# Patient Record
Sex: FEMALE | Race: WHITE | NOT HISPANIC OR LATINO
[De-identification: names, ages, dates, MRNs, and addresses within clinical notes are randomized per-mention and may not be internally consistent; named-entity substitution may affect disease eponyms.]

---

## 2017-07-20 PROBLEM — O24.419 GESTATIONAL DIABETES MELLITUS (GDM) IN THIRD TRIMESTER, GESTATIONAL DIABETES METHOD OF CONTROL UNSPECIFIED: Status: ACTIVE | Noted: 2017-07-20

## 2017-07-27 ENCOUNTER — HOSPITAL (OUTPATIENT)
Dept: OTHER | Age: 37
End: 2017-07-27
Attending: OBSTETRICS & GYNECOLOGY

## 2017-08-16 ENCOUNTER — HOSPITAL ENCOUNTER (OUTPATIENT)
Facility: HOSPITAL | Age: 37
Setting detail: OBSERVATION
Discharge: HOME | End: 2017-08-16
Attending: OBSTETRICS & GYNECOLOGY | Admitting: OBSTETRICS & GYNECOLOGY
Payer: MEDICAID

## 2017-08-16 VITALS
SYSTOLIC BLOOD PRESSURE: 118 MMHG | HEART RATE: 90 BPM | RESPIRATION RATE: 18 BRPM | TEMPERATURE: 98 F | DIASTOLIC BLOOD PRESSURE: 84 MMHG

## 2017-08-16 PROBLEM — Z34.90 PREGNANCY: Status: ACTIVE | Noted: 2017-08-16

## 2017-08-16 LAB
BILIRUBIN URINE: NEGATIVE
BLOOD URINE: NEGATIVE
CONTROL RUN WITHIN 24 HOURS?: YES
FETAL FIBRINECTIN: NEGATIVE
GLUCOSE BLD-MCNC: 79 MG/DL (ref 65–99)
GLUCOSE URINE: NEGATIVE
KETONE URINE: NEGATIVE
LEUKOCYTE ESTERASE URINE: NEGATIVE
NITRITE URINE: NEGATIVE
PH URINE: 7 (ref 5–8)
PROTEIN URINE: NEGATIVE
SPEC GRAVITY: 1.01 (ref 1–1.03)
URINE CLARITY: CLEAR
UROBILINOGEN URINE: 0.2

## 2017-08-16 PROCEDURE — 82962 GLUCOSE BLOOD TEST: CPT

## 2017-08-16 PROCEDURE — 96360 HYDRATION IV INFUSION INIT: CPT

## 2017-08-16 PROCEDURE — 82731 ASSAY OF FETAL FIBRONECTIN: CPT | Performed by: OBSTETRICS & GYNECOLOGY

## 2017-08-16 PROCEDURE — 96372 THER/PROPH/DIAG INJ SC/IM: CPT

## 2017-08-16 PROCEDURE — 96361 HYDRATE IV INFUSION ADD-ON: CPT

## 2017-08-16 PROCEDURE — 81002 URINALYSIS NONAUTO W/O SCOPE: CPT

## 2017-08-16 RX ORDER — SODIUM CHLORIDE, SODIUM LACTATE, POTASSIUM CHLORIDE, CALCIUM CHLORIDE 600; 310; 30; 20 MG/100ML; MG/100ML; MG/100ML; MG/100ML
INJECTION, SOLUTION INTRAVENOUS CONTINUOUS
Status: DISCONTINUED | OUTPATIENT
Start: 2017-08-16 | End: 2017-08-16

## 2017-08-16 RX ORDER — TERBUTALINE SULFATE 1 MG/ML
0.25 INJECTION, SOLUTION SUBCUTANEOUS
Status: DISPENSED | OUTPATIENT
Start: 2017-08-16 | End: 2017-08-16

## 2017-08-16 RX ORDER — FERROUS SULFATE 325(65) MG
TABLET ORAL
Refills: 6 | Status: ON HOLD | COMMUNITY
Start: 2017-07-05 | End: 2017-08-16

## 2017-08-16 NOTE — NST
Patient: Marcelo Hanson    Gestation: 31w3d r/o PTL                                                                                                         Interpretation: Appropriate for gestational age Yissel Jain viewed fht strip at [facility])

## 2017-08-16 NOTE — H&P
[facility]  History & Physical    Blaine Amla Patient Status:  Observation    1980 MRN [id number]   Location [address] [facility] Attending Loren Ramos MD   Hosp Day # 0 PCP Ryan Navarrete MD     SUBJECTIVE:    Blaine Marquez is 20  BP: (118)/(84) 118/84    Abdomen:  soft, nontender, gravid   Fetal Surveillance:  130s, reactive   Initially q 3 min, now s/p terbutaline, irreg      Cervix: 1/50/-2 by RN     Lab Review:    Admission on 08/16/2017   Component Date Value   • control ru

## 2017-08-17 PROBLEM — O09.529 ADVANCED MATERNAL AGE IN MULTIGRAVIDA: Status: ACTIVE | Noted: 2017-08-17

## 2017-08-17 PROBLEM — Z87.59 HISTORY OF PRIOR PREGNANCY WITH SGA NEWBORN: Status: ACTIVE | Noted: 2017-08-17

## 2017-08-18 ENCOUNTER — HOSPITAL ENCOUNTER (INPATIENT)
Facility: HOSPITAL | Age: 37
LOS: 2 days | Discharge: HOME | DRG: 781 | End: 2017-08-20
Attending: OBSTETRICS & GYNECOLOGY | Admitting: OBSTETRICS & GYNECOLOGY
Payer: MEDICAID

## 2017-08-18 DIAGNOSIS — K83.1 CHOLESTASIS DURING PREGNANCY, ANTEPARTUM: Primary | ICD-10-CM

## 2017-08-18 DIAGNOSIS — O26.619 CHOLESTASIS DURING PREGNANCY, ANTEPARTUM: Primary | ICD-10-CM

## 2017-08-18 DIAGNOSIS — Z3A.32 32 WEEKS GESTATION OF PREGNANCY: ICD-10-CM

## 2017-08-18 LAB
ALBUMIN SERPL-MCNC: 1.8 G/DL (ref 3.5–4.8)
ALP LIVER SERPL-CCNC: 324 U/L (ref 37–98)
ALT SERPL-CCNC: 140 U/L (ref 14–54)
AST SERPL-CCNC: 110 U/L (ref 15–41)
BASOPHILS # BLD AUTO: 0.03 X10(3) UL (ref 0–0.1)
BASOPHILS NFR BLD AUTO: 0.2 %
BILIRUB SERPL-MCNC: 0.5 MG/DL (ref 0.1–2)
BUN BLD-MCNC: 8 MG/DL (ref 8–20)
CALCIUM BLD-MCNC: 8.5 MG/DL (ref 8.3–10.3)
CHLORIDE: 102 MMOL/L (ref 101–111)
CO2: 24 MMOL/L (ref 22–32)
CREAT BLD-MCNC: 0.62 MG/DL (ref 0.55–1.02)
EOSINOPHIL # BLD AUTO: 0.03 X10(3) UL (ref 0–0.3)
EOSINOPHIL NFR BLD AUTO: 0.2 %
ERYTHROCYTE [DISTWIDTH] IN BLOOD BY AUTOMATED COUNT: 14.5 % (ref 11.5–16)
GLUCOSE BLD-MCNC: 79 MG/DL (ref 70–99)
HCT VFR BLD AUTO: 31.7 % (ref 34–50)
HGB BLD-MCNC: 9.8 G/DL (ref 12–16)
IMMATURE GRANULOCYTE COUNT: 0.14 X10(3) UL (ref 0–1)
IMMATURE GRANULOCYTE RATIO %: 1.2 %
LYMPHOCYTES # BLD AUTO: 2.05 X10(3) UL (ref 0.9–4)
LYMPHOCYTES NFR BLD AUTO: 16.9 %
M PROTEIN MFR SERPL ELPH: 6.7 G/DL (ref 6.1–8.3)
MCH RBC QN AUTO: 25.1 PG (ref 27–33.2)
MCHC RBC AUTO-ENTMCNC: 30.9 G/DL (ref 31–37)
MCV RBC AUTO: 81.3 FL (ref 81–100)
MONOCYTES # BLD AUTO: 0.65 X10(3) UL (ref 0.1–0.6)
MONOCYTES NFR BLD AUTO: 5.4 %
NEUTROPHIL ABS PRELIM: 9.22 X10 (3) UL (ref 1.3–6.7)
NEUTROPHILS # BLD AUTO: 9.22 X10(3) UL (ref 1.3–6.7)
NEUTROPHILS NFR BLD AUTO: 76.1 %
PLATELET # BLD AUTO: 190 10(3)UL (ref 150–450)
POTASSIUM SERPL-SCNC: 3.9 MMOL/L (ref 3.6–5.1)
RBC # BLD AUTO: 3.9 X10(6)UL (ref 3.8–5.1)
RED CELL DISTRIBUTION WIDTH-SD: 42.3 FL (ref 35.1–46.3)
SODIUM SERPL-SCNC: 136 MMOL/L (ref 136–144)
WBC # BLD AUTO: 12.1 X10(3) UL (ref 4–13)

## 2017-08-18 PROCEDURE — 84550 ASSAY OF BLOOD/URIC ACID: CPT | Performed by: OBSTETRICS & GYNECOLOGY

## 2017-08-18 PROCEDURE — 85025 COMPLETE CBC W/AUTO DIFF WBC: CPT | Performed by: OBSTETRICS & GYNECOLOGY

## 2017-08-18 PROCEDURE — 80053 COMPREHEN METABOLIC PANEL: CPT | Performed by: OBSTETRICS & GYNECOLOGY

## 2017-08-18 RX ORDER — ONDANSETRON 2 MG/ML
4 INJECTION INTRAMUSCULAR; INTRAVENOUS EVERY 6 HOURS PRN
Status: DISCONTINUED | OUTPATIENT
Start: 2017-08-18 | End: 2017-08-20

## 2017-08-18 RX ORDER — TRISODIUM CITRATE DIHYDRATE AND CITRIC ACID MONOHYDRATE 500; 334 MG/5ML; MG/5ML
30 SOLUTION ORAL ONCE
Status: COMPLETED | OUTPATIENT
Start: 2017-08-18 | End: 2017-08-18

## 2017-08-18 RX ORDER — SODIUM CHLORIDE, SODIUM LACTATE, POTASSIUM CHLORIDE, CALCIUM CHLORIDE 600; 310; 30; 20 MG/100ML; MG/100ML; MG/100ML; MG/100ML
INJECTION, SOLUTION INTRAVENOUS CONTINUOUS
Status: DISCONTINUED | OUTPATIENT
Start: 2017-08-18 | End: 2017-08-20

## 2017-08-19 ENCOUNTER — APPOINTMENT (OUTPATIENT)
Dept: ULTRASOUND IMAGING | Facility: HOSPITAL | Age: 37
DRG: 781 | End: 2017-08-19
Attending: OBSTETRICS & GYNECOLOGY
Payer: MEDICAID

## 2017-08-19 LAB
ALBUMIN SERPL-MCNC: 1.7 G/DL (ref 3.5–4.8)
ALP LIVER SERPL-CCNC: 291 U/L (ref 37–98)
ALT SERPL-CCNC: 142 U/L (ref 14–54)
ANTIBODY SCREEN: NEGATIVE
AST SERPL-CCNC: 118 U/L (ref 15–41)
BASOPHILS # BLD AUTO: 0.02 X10(3) UL (ref 0–0.1)
BASOPHILS NFR BLD AUTO: 0.2 %
BILIRUB SERPL-MCNC: 0.6 MG/DL (ref 0.1–2)
BILIRUB UR QL STRIP.AUTO: NEGATIVE
BLOOD URINE: NEGATIVE
BUN BLD-MCNC: 9 MG/DL (ref 8–20)
CALCIUM BLD-MCNC: 8.6 MG/DL (ref 8.3–10.3)
CHLORIDE: 103 MMOL/L (ref 101–111)
CLARITY UR REFRACT.AUTO: CLEAR
CO2: 25 MMOL/L (ref 22–32)
COLOR UR AUTO: YELLOW
CONTROL RUN WITHIN 24 HOURS?: YES
CREAT BLD-MCNC: 0.57 MG/DL (ref 0.55–1.02)
EOSINOPHIL # BLD AUTO: 0.02 X10(3) UL (ref 0–0.3)
EOSINOPHIL NFR BLD AUTO: 0.2 %
ERYTHROCYTE [DISTWIDTH] IN BLOOD BY AUTOMATED COUNT: 14.7 % (ref 11.5–16)
GLUCOSE BLD-MCNC: 70 MG/DL (ref 70–99)
GLUCOSE BLD-MCNC: 99 MG/DL (ref 65–99)
GLUCOSE BLD-MCNC: 99 MG/DL (ref 65–99)
GLUCOSE UR STRIP.AUTO-MCNC: NEGATIVE MG/DL
GLUCOSE URINE: NEGATIVE
HCT VFR BLD AUTO: 27.9 % (ref 34–50)
HGB BLD-MCNC: 8.6 G/DL (ref 12–16)
IMMATURE GRANULOCYTE COUNT: 0.11 X10(3) UL (ref 0–1)
IMMATURE GRANULOCYTE RATIO %: 1.1 %
KETONE URINE: 40
KETONES UR STRIP.AUTO-MCNC: NEGATIVE MG/DL
LEUKOCYTE ESTERASE URINE: NEGATIVE
LYMPHOCYTES # BLD AUTO: 2.43 X10(3) UL (ref 0.9–4)
LYMPHOCYTES NFR BLD AUTO: 24 %
M PROTEIN MFR SERPL ELPH: 6.2 G/DL (ref 6.1–8.3)
MCH RBC QN AUTO: 25.6 PG (ref 27–33.2)
MCHC RBC AUTO-ENTMCNC: 30.8 G/DL (ref 31–37)
MCV RBC AUTO: 83 FL (ref 81–100)
MONOCYTES # BLD AUTO: 0.5 X10(3) UL (ref 0.1–0.6)
MONOCYTES NFR BLD AUTO: 4.9 %
NEUTROPHIL ABS PRELIM: 7.05 X10 (3) UL (ref 1.3–6.7)
NEUTROPHILS # BLD AUTO: 7.05 X10(3) UL (ref 1.3–6.7)
NEUTROPHILS NFR BLD AUTO: 69.6 %
NITRITE UR QL STRIP.AUTO: NEGATIVE
NITRITE URINE: NEGATIVE
PH UR STRIP.AUTO: 6 [PH] (ref 4.5–8)
PH URINE: 7 (ref 5–8)
PLATELET # BLD AUTO: 160 10(3)UL (ref 150–450)
POTASSIUM SERPL-SCNC: 3.6 MMOL/L (ref 3.6–5.1)
PROT UR STRIP.AUTO-MCNC: NEGATIVE MG/DL
PROTEIN URINE: 30
RBC # BLD AUTO: 3.36 X10(6)UL (ref 3.8–5.1)
RED CELL DISTRIBUTION WIDTH-SD: 44.2 FL (ref 35.1–46.3)
RH BLOOD TYPE: POSITIVE
SODIUM SERPL-SCNC: 137 MMOL/L (ref 136–144)
SP GR UR STRIP.AUTO: 1.01 (ref 1–1.03)
SPEC GRAVITY: 1.02 (ref 1–1.03)
T PALLIDUM AB SER QL IA: NONREACTIVE
URIC ACID: 3.8 MG/DL (ref 2.4–8)
URIC ACID: 4.1 MG/DL (ref 2.4–8)
UROBILINOGEN UR STRIP.AUTO-MCNC: <2 MG/DL
UROBILINOGEN URINE: 1
WBC # BLD AUTO: 10.1 X10(3) UL (ref 4–13)

## 2017-08-19 PROCEDURE — 84550 ASSAY OF BLOOD/URIC ACID: CPT | Performed by: OBSTETRICS & GYNECOLOGY

## 2017-08-19 PROCEDURE — 86901 BLOOD TYPING SEROLOGIC RH(D): CPT | Performed by: OBSTETRICS & GYNECOLOGY

## 2017-08-19 PROCEDURE — 86900 BLOOD TYPING SEROLOGIC ABO: CPT | Performed by: OBSTETRICS & GYNECOLOGY

## 2017-08-19 PROCEDURE — 87086 URINE CULTURE/COLONY COUNT: CPT | Performed by: OBSTETRICS & GYNECOLOGY

## 2017-08-19 PROCEDURE — 81002 URINALYSIS NONAUTO W/O SCOPE: CPT

## 2017-08-19 PROCEDURE — 80053 COMPREHEN METABOLIC PANEL: CPT | Performed by: OBSTETRICS & GYNECOLOGY

## 2017-08-19 PROCEDURE — 82962 GLUCOSE BLOOD TEST: CPT

## 2017-08-19 PROCEDURE — 86850 RBC ANTIBODY SCREEN: CPT | Performed by: OBSTETRICS & GYNECOLOGY

## 2017-08-19 PROCEDURE — 76805 OB US >/= 14 WKS SNGL FETUS: CPT | Performed by: OBSTETRICS & GYNECOLOGY

## 2017-08-19 PROCEDURE — 85025 COMPLETE CBC W/AUTO DIFF WBC: CPT | Performed by: OBSTETRICS & GYNECOLOGY

## 2017-08-19 PROCEDURE — 82239 BILE ACIDS TOTAL: CPT | Performed by: OBSTETRICS & GYNECOLOGY

## 2017-08-19 PROCEDURE — 86780 TREPONEMA PALLIDUM: CPT | Performed by: OBSTETRICS & GYNECOLOGY

## 2017-08-19 PROCEDURE — 81001 URINALYSIS AUTO W/SCOPE: CPT | Performed by: OBSTETRICS & GYNECOLOGY

## 2017-08-19 PROCEDURE — 76700 US EXAM ABDOM COMPLETE: CPT | Performed by: OBSTETRICS & GYNECOLOGY

## 2017-08-19 PROCEDURE — 76819 FETAL BIOPHYS PROFIL W/O NST: CPT | Performed by: OBSTETRICS & GYNECOLOGY

## 2017-08-19 RX ORDER — HYDROMORPHONE HYDROCHLORIDE 1 MG/ML
1 INJECTION, SOLUTION INTRAMUSCULAR; INTRAVENOUS; SUBCUTANEOUS ONCE
Status: COMPLETED | OUTPATIENT
Start: 2017-08-19 | End: 2017-08-19

## 2017-08-19 RX ORDER — ACETAMINOPHEN 500 MG
1000 TABLET ORAL EVERY 6 HOURS PRN
Status: DISCONTINUED | OUTPATIENT
Start: 2017-08-19 | End: 2017-08-20

## 2017-08-19 RX ORDER — DEXTROSE MONOHYDRATE 25 G/50ML
50 INJECTION, SOLUTION INTRAVENOUS
Status: DISCONTINUED | OUTPATIENT
Start: 2017-08-19 | End: 2017-08-20

## 2017-08-19 RX ORDER — HYDROMORPHONE HYDROCHLORIDE 1 MG/ML
INJECTION, SOLUTION INTRAMUSCULAR; INTRAVENOUS; SUBCUTANEOUS
Status: COMPLETED
Start: 2017-08-19 | End: 2017-08-19

## 2017-08-19 RX ORDER — DOCUSATE SODIUM 100 MG/1
100 CAPSULE, LIQUID FILLED ORAL 2 TIMES DAILY
Status: DISCONTINUED | OUTPATIENT
Start: 2017-08-19 | End: 2017-08-20

## 2017-08-19 RX ORDER — HYDROMORPHONE HYDROCHLORIDE 1 MG/ML
1 INJECTION, SOLUTION INTRAMUSCULAR; INTRAVENOUS; SUBCUTANEOUS EVERY 6 HOURS PRN
Status: DISCONTINUED | OUTPATIENT
Start: 2017-08-19 | End: 2017-08-20

## 2017-08-19 RX ORDER — URSODIOL 300 MG/1
300 CAPSULE ORAL 2 TIMES DAILY
Status: DISCONTINUED | OUTPATIENT
Start: 2017-08-19 | End: 2017-08-20

## 2017-08-19 RX ORDER — SODIUM CHLORIDE, SODIUM LACTATE, POTASSIUM CHLORIDE, CALCIUM CHLORIDE 600; 310; 30; 20 MG/100ML; MG/100ML; MG/100ML; MG/100ML
INJECTION, SOLUTION INTRAVENOUS CONTINUOUS
Status: DISCONTINUED | OUTPATIENT
Start: 2017-08-19 | End: 2017-08-19

## 2017-08-19 RX ORDER — CALCIUM CARBONATE 200(500)MG
1000 TABLET,CHEWABLE ORAL
Status: DISCONTINUED | OUTPATIENT
Start: 2017-08-19 | End: 2017-08-20

## 2017-08-19 RX ORDER — ZOLPIDEM TARTRATE 5 MG/1
5 TABLET ORAL NIGHTLY PRN
Status: DISCONTINUED | OUTPATIENT
Start: 2017-08-19 | End: 2017-08-20

## 2017-08-19 NOTE — DIETARY NOTE
Nutrition Short Note   Pt provided with Gestational Diabetic Education including carb counting, label reading, and consistent meals/snack intake. Handout provided. All questions answered. RD to follow PRN.       Damaris Oates MS, RD, LDN  Pager [phone]

## 2017-08-19 NOTE — PROGRESS NOTES
Pt transferred to room 118 via wc accompanied by RN and family member. Having difficulty ambulating independently d/t back pain.

## 2017-08-19 NOTE — PROGRESS NOTES
EDC 10/15/17 (31 & 5/7 weeks gestation).  presents to triage 4 complaining of persistent nausea and vomiting all day long. Took Zofran this am without improvement. Patient states she is vomiting one per hour.   Last ate and drank in the am.  Reports

## 2017-08-19 NOTE — CONSULTS
[address] [place] [address] Patient Status:  Inpatient    1980 MRN [id number]   Location [address] [place] [address] Attending Melissa Beck MD   Hosp Day # 1 PCP David Lu MD     SUBJECTIVE:  Reason for Admiss 2 Current            1 Term 08/10/08 40w0d  5 lb (2.268 kg) F NORMAL SPONT  N JERALD            Social History:     Smoking status: Never Smoker    Smokeless tobacco: Never Used    Alcohol use No        Review of Systems:  Unremarkable except as above.     O ____________________________________________________________________________  Anatomy Scan:  Marie gestation.   Biometry:  BPD 82.8 mm 82nd% 33w2d (32w2d to 34w2d)  .6 mm 15th% 31w6d (28w6d to 34w6d)  .6 mm 72nd% 32w5d (31w5d to 33w5d)  08/19/2017   CO2 25.0 08/19/2017   GLU 70 08/19/2017   CA 8.6 08/19/2017   ALB 1.7 08/19/2017   ALKPHO 291 08/19/2017   BILT 0.6 08/19/2017   TP 6.2 08/19/2017    08/19/2017    08/19/2017       ASSESSMENT:  1. TRINIDAD @  31w6d   2.  Briana and an increased risk for  respiratory distress syndrome. The best approach is early delivery, the timing of which should be guided by the patients symptoms (mostly pruritus), the gestational age, and whether the cervix is favorable.  In m blood sugars at fasting and 2 hour postprandially. Fasting blood glucose should be less than 95 and two hour postprandial <120. Thank you for allowing me to participate in the care of your patient.   Please do not hesitate to call with any q

## 2017-08-19 NOTE — IMAGING NOTE
History: : 2 Para: 1. Previous pregnancies: Children born at term: 1. Living children: 1.    Obstetric History:   Details on previous pregnancies: Living children >=37W: 1.  ____________________________________________________________________________ 8.     Summary:  testing is reassuring.   We discussed fetal movement counts.    ____________________________________________________________________________  Comments:    Ultrasound findings:  limited by late gestational age    The fetal measureme

## 2017-08-19 NOTE — PLAN OF CARE
Problem: Patient/Family Goals  Goal: Patient/Family Long Term Goal  Patient's Long Term Goal: Discharge home    Interventions:  -   - See additional Care Plan goals for specific interventions   Outcome: Progressing    Goal: Patient/Family Short Term Goal

## 2017-08-19 NOTE — H&P
[facility]  History & Physical    Kristy Oliveira Patient Status:  Inpatient    1980 MRN [id number]   Location [address] [facility] Attending Audra Wray MD   Hosp Day # 1 PCP Génesis Mckeon MD     Subjective:  Kristy Oliveira is a 3 BPM   Uterine Size: size equals dates   Presentations: cephalic   Cervix:     Dilation: 1cm    Effacement: Long    Station:  Floating    Consistency: medium    Per exam this week      Lab Review   B, Rh+   AFP:patient declined   One hour GTT: elevated

## 2017-08-19 NOTE — H&P
[facility]  History & Physical    SUBJECTIVE:    Sara Ash is a 30year old  at 31w6d who presents nausea/vomiting and epigastric pain. Pt seen on 17 for  contractions, fFN neg, received terbutaline, discharged home.   Reports on WBC 12.1 08/18/2017   HGB 9.8 08/18/2017   HCT 31.7 08/18/2017   .0 08/18/2017     Recent Labs   Lab  08/18/17   2325   GLU  79   BUN  8   CREATSERUM  0.62   CA  8.5   ALB  1.8*   NA  136   K  3.9   CL  102   CO2  24.0   ALKPHO  324*   AST  110*

## 2017-08-20 VITALS
HEIGHT: 60 IN | BODY MASS INDEX: 35.93 KG/M2 | DIASTOLIC BLOOD PRESSURE: 78 MMHG | TEMPERATURE: 98 F | RESPIRATION RATE: 18 BRPM | SYSTOLIC BLOOD PRESSURE: 123 MMHG | HEART RATE: 81 BPM | WEIGHT: 183 LBS

## 2017-08-20 PROBLEM — K83.1 CHOLESTASIS DURING PREGNANCY, ANTEPARTUM: Status: ACTIVE | Noted: 2017-08-20

## 2017-08-20 PROBLEM — O26.619 CHOLESTASIS DURING PREGNANCY, ANTEPARTUM: Status: ACTIVE | Noted: 2017-08-20

## 2017-08-20 LAB
ALBUMIN SERPL-MCNC: 1.6 G/DL (ref 3.5–4.8)
ALP LIVER SERPL-CCNC: 305 U/L (ref 37–98)
ALT SERPL-CCNC: 117 U/L (ref 14–54)
AST SERPL-CCNC: 81 U/L (ref 15–41)
BASOPHILS # BLD AUTO: 0.04 X10(3) UL (ref 0–0.1)
BASOPHILS NFR BLD AUTO: 0.4 %
BILE ACIDS, TOTAL: 5 UMOL/L
BILIRUB SERPL-MCNC: 0.5 MG/DL (ref 0.1–2)
BUN BLD-MCNC: 14 MG/DL (ref 8–20)
CALCIUM BLD-MCNC: 8.3 MG/DL (ref 8.3–10.3)
CHLORIDE: 102 MMOL/L (ref 101–111)
CO2: 23 MMOL/L (ref 22–32)
CREAT BLD-MCNC: 0.6 MG/DL (ref 0.55–1.02)
EOSINOPHIL # BLD AUTO: 0.07 X10(3) UL (ref 0–0.3)
EOSINOPHIL NFR BLD AUTO: 0.7 %
ERYTHROCYTE [DISTWIDTH] IN BLOOD BY AUTOMATED COUNT: 14.6 % (ref 11.5–16)
GLUCOSE BLD-MCNC: 76 MG/DL (ref 70–99)
HCT VFR BLD AUTO: 29 % (ref 34–50)
HGB BLD-MCNC: 8.7 G/DL (ref 12–16)
IMMATURE GRANULOCYTE COUNT: 0.22 X10(3) UL (ref 0–1)
IMMATURE GRANULOCYTE RATIO %: 2.1 %
LYMPHOCYTES # BLD AUTO: 2.77 X10(3) UL (ref 0.9–4)
LYMPHOCYTES NFR BLD AUTO: 25.9 %
M PROTEIN 24H UR ELPH-MRATE: 518 MG/24 HR (ref ?–100)
M PROTEIN MFR SERPL ELPH: 6.2 G/DL (ref 6.1–8.3)
MCH RBC QN AUTO: 24.8 PG (ref 27–33.2)
MCHC RBC AUTO-ENTMCNC: 30 G/DL (ref 31–37)
MCV RBC AUTO: 82.6 FL (ref 81–100)
MONOCYTES # BLD AUTO: 0.63 X10(3) UL (ref 0.1–0.6)
MONOCYTES NFR BLD AUTO: 5.9 %
NEUTROPHIL ABS PRELIM: 6.96 X10 (3) UL (ref 1.3–6.7)
NEUTROPHILS # BLD AUTO: 6.96 X10(3) UL (ref 1.3–6.7)
NEUTROPHILS NFR BLD AUTO: 65 %
PLATELET # BLD AUTO: 159 10(3)UL (ref 150–450)
POTASSIUM SERPL-SCNC: 3.8 MMOL/L (ref 3.6–5.1)
RBC # BLD AUTO: 3.51 X10(6)UL (ref 3.8–5.1)
RED CELL DISTRIBUTION WIDTH-SD: 43.7 FL (ref 35.1–46.3)
SODIUM SERPL-SCNC: 136 MMOL/L (ref 136–144)
SPECIMEN VOL UR: 1200 ML
URIC ACID: 3 MG/DL (ref 2.4–8)
WBC # BLD AUTO: 10.7 X10(3) UL (ref 4–13)

## 2017-08-20 PROCEDURE — 85025 COMPLETE CBC W/AUTO DIFF WBC: CPT | Performed by: OBSTETRICS & GYNECOLOGY

## 2017-08-20 PROCEDURE — 84156 ASSAY OF PROTEIN URINE: CPT | Performed by: OBSTETRICS & GYNECOLOGY

## 2017-08-20 PROCEDURE — 80053 COMPREHEN METABOLIC PANEL: CPT | Performed by: OBSTETRICS & GYNECOLOGY

## 2017-08-20 PROCEDURE — 84550 ASSAY OF BLOOD/URIC ACID: CPT | Performed by: OBSTETRICS & GYNECOLOGY

## 2017-08-20 RX ORDER — ONDANSETRON 4 MG/1
4 TABLET, FILM COATED ORAL EVERY 8 HOURS PRN
Qty: 20 TABLET | Refills: 1 | Status: SHIPPED | OUTPATIENT
Start: 2017-08-20 | End: 2017-10-03 | Stop reason: ALTCHOICE

## 2017-08-20 RX ORDER — URSODIOL 300 MG/1
300 CAPSULE ORAL 2 TIMES DAILY
Qty: 60 CAPSULE | Refills: 1 | Status: SHIPPED | OUTPATIENT
Start: 2017-08-20 | End: 2017-10-03 | Stop reason: ALTCHOICE

## 2017-08-20 NOTE — PROGRESS NOTES
OB/GYN: Antepartum Progress Note     SUBJECTIVE:  Interval History:  Patient feeling better, itching improved with medication  Fetal Movement: good  Vaginal Bleeding: none  Contractions: irregular, non palpable  Leakage of Fluid: none    OBJECTIVE:  Nesha pregnancy with SGA      Advanced maternal age in multigravida     Cholestasis during pregnancy, antepartum      Tricia Martin  2017  9:17 AM

## 2017-08-20 NOTE — PLAN OF CARE
Problem: Patient/Family Goals  Goal: Patient/Family Long Term Goal  Patient's Long Term Goal: Discharge home    Interventions:  -   - See additional Care Plan goals for specific interventions    Outcome: Progressing    Goal: Patient/Family Short Term Goal hyperglycemia and hypoglycemia  - Administer ordered medications to maintain glucose within target range  - Assess barriers to adequate nutritional intake and initiate nutrition consult as needed  - Instruct patient on self management of diabetes   Outcome

## 2017-08-20 NOTE — PROGRESS NOTES
now 32 weeks admitted to R/O PIH vs Cholestasis. Protein in 24hr urine 518. AM labs pending. Pt has intense pruitis relieved by Ursodiol. BP WNL. No C/O pain,nausea or vomiting. Newly diagnosed GDM. Sugars have been stable.   Reactive NST with m

## 2017-08-20 NOTE — PROGRESS NOTES
Discharge instructions given which include 24 hour urine collection instructions. Prescriptions ready to be picked up pharmacy. Call to make follow up appointment on Friday with Dr. Sadiq Booth.  Return to [facility] lab on Thursday to return 24 hour urine and

## 2017-08-21 PROBLEM — R87.810 CERVICAL HIGH RISK HPV (HUMAN PAPILLOMAVIRUS) TEST POSITIVE: Status: ACTIVE | Noted: 2017-08-21

## 2017-08-22 NOTE — PAYOR COMM NOTE
--------------  DISCHARGE REVIEW    Payor: Siddhartha Pal #:  [id number]  Authorization Number: N/A    Admit date: 8/18/17  Admit time:  2206  Discharge Date: 8/20/2017 12:08 PM     Admitting Physician: Ab Resendez MD capsule by mouth daily.       STOP taking these medications    Ondansetron HCl (ZOFRAN) 4 mg tablet    Ondansetron HCl (ZOFRAN) 8 MG tablet

## 2017-08-22 NOTE — PAYOR COMM NOTE
--------------  ADMISSION REVIEW     Payor: Siddhartha Pal #:  [id number]  Authorization Number: N/A    Admit date: 8/18/17  Admit time: 2206       Admitting Physician: Margarito Asif MD  Attending Physician:  No att.  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 No pertinent surgical history.    Family History   Problem Relation Age of Onset Illness:  Patient is a 30year old female.   No LMP recorded. Patient is pregnant. No pain now. The pain started yesterday in left side of back and around to front. Denies contractions. No bleeding or leaking. Has had N/V all pregnancy.   Has b Systems:  Unremarkable except as above.     OBJECTIVE:  Temp:  [97.8 °F (36.6 °C)-98.6 °F (37 °C)] 98.1 °F (36.7 °C)  Pulse:  [81-96] 95  Resp:  [18-22] 22  BP: (111-130)/(67-73) 122/73     Intake/Output Summary (Last 24 hours) at 08/19/17 1112  Last data    ____________________________________________________________________________  Anatomy Scan:  Marie gestation.   Biometry:  BPD                                  82.8              mm               82nd%                    33w2d                    (32 fluid volume: normal (2). Score 8 / 8.      Summary:  testing is reassuring.   We discussed fetal movement counts.     ____________________________________________________________________________  Comments:     Ultrasound findings:  limited by late soles of the feet, and is worse at night. Pruritus may precede laboratory abnormalities. Serum total bile acid concentrations increase in ICP, and may be the first or only laboratory abnormality. Serum aminotransferases are also elevated.     Treatment fo diabetes later in life. The importance of good glycemic control and avoidance of prolonged hypo- and hyperglycemia was addressed. She was instructed how to assess her blood sugar and started on a diabetic diet today.  She was instructed to call us in

## 2017-08-24 ENCOUNTER — HOSPITAL ENCOUNTER (INPATIENT)
Facility: HOSPITAL | Age: 37
LOS: 3 days | Discharge: HOME | End: 2017-08-27
Attending: OBSTETRICS & GYNECOLOGY | Admitting: OBSTETRICS & GYNECOLOGY
Payer: MEDICAID

## 2017-08-24 ENCOUNTER — APPOINTMENT (OUTPATIENT)
Dept: ULTRASOUND IMAGING | Facility: HOSPITAL | Age: 37
End: 2017-08-24
Attending: OBSTETRICS & GYNECOLOGY
Payer: MEDICAID

## 2017-08-24 ENCOUNTER — LAB ENCOUNTER (OUTPATIENT)
Dept: LAB | Facility: HOSPITAL | Age: 37
End: 2017-08-24
Attending: OBSTETRICS & GYNECOLOGY
Payer: MEDICAID

## 2017-08-24 DIAGNOSIS — O26.619 CHOLESTASIS DURING PREGNANCY, ANTEPARTUM: ICD-10-CM

## 2017-08-24 DIAGNOSIS — Z3A.32 32 WEEKS GESTATION OF PREGNANCY: ICD-10-CM

## 2017-08-24 DIAGNOSIS — K83.1 CHOLESTASIS DURING PREGNANCY, ANTEPARTUM: ICD-10-CM

## 2017-08-24 PROBLEM — R74.8 ELEVATED LIVER ENZYMES: Status: ACTIVE | Noted: 2017-08-24

## 2017-08-24 LAB
ALBUMIN SERPL-MCNC: 1.9 G/DL (ref 3.5–4.8)
ALP LIVER SERPL-CCNC: 389 U/L (ref 37–98)
ALT SERPL-CCNC: 163 U/L (ref 14–54)
AST SERPL-CCNC: 111 U/L (ref 15–41)
BASOPHILS # BLD AUTO: 0.06 X10(3) UL (ref 0–0.1)
BASOPHILS # BLD AUTO: 0.07 X10(3) UL (ref 0–0.1)
BASOPHILS NFR BLD AUTO: 0.3 %
BASOPHILS NFR BLD AUTO: 0.6 %
BILIRUB SERPL-MCNC: 0.5 MG/DL (ref 0.1–2)
BUN BLD-MCNC: 10 MG/DL (ref 8–20)
CALCIUM BLD-MCNC: 9.3 MG/DL (ref 8.3–10.3)
CHLORIDE: 101 MMOL/L (ref 101–111)
CO2: 24 MMOL/L (ref 22–32)
CREAT BLD-MCNC: 0.68 MG/DL (ref 0.55–1.02)
CREAT UR-SCNC: 1.02 G/24 HR (ref 0.6–1.8)
EOSINOPHIL # BLD AUTO: 0.01 X10(3) UL (ref 0–0.3)
EOSINOPHIL # BLD AUTO: 0.08 X10(3) UL (ref 0–0.3)
EOSINOPHIL NFR BLD AUTO: 0 %
EOSINOPHIL NFR BLD AUTO: 0.6 %
ERYTHROCYTE [DISTWIDTH] IN BLOOD BY AUTOMATED COUNT: 14.7 % (ref 11.5–16)
ERYTHROCYTE [DISTWIDTH] IN BLOOD BY AUTOMATED COUNT: 14.9 % (ref 11.5–16)
GLUCOSE BLD-MCNC: 87 MG/DL (ref 70–99)
HCT VFR BLD AUTO: 33.9 % (ref 34–50)
HCT VFR BLD AUTO: 35.5 % (ref 34–50)
HGB BLD-MCNC: 10.4 G/DL (ref 12–16)
HGB BLD-MCNC: 10.7 G/DL (ref 12–16)
IMMATURE GRANULOCYTE COUNT: 0.28 X10(3) UL (ref 0–1)
IMMATURE GRANULOCYTE COUNT: 0.29 X10(3) UL (ref 0–1)
IMMATURE GRANULOCYTE RATIO %: 1.4 %
IMMATURE GRANULOCYTE RATIO %: 2.3 %
LYMPHOCYTES # BLD AUTO: 1.71 X10(3) UL (ref 0.9–4)
LYMPHOCYTES # BLD AUTO: 2.49 X10(3) UL (ref 0.9–4)
LYMPHOCYTES NFR BLD AUTO: 20.1 %
LYMPHOCYTES NFR BLD AUTO: 8.4 %
M PROTEIN 24H UR ELPH-MRATE: 536 MG/24 HR (ref ?–100)
M PROTEIN MFR SERPL ELPH: 7.4 G/DL (ref 6.1–8.3)
MCH RBC QN AUTO: 24.9 PG (ref 27–33.2)
MCH RBC QN AUTO: 25.1 PG (ref 27–33.2)
MCHC RBC AUTO-ENTMCNC: 30.1 G/DL (ref 31–37)
MCHC RBC AUTO-ENTMCNC: 30.7 G/DL (ref 31–37)
MCV RBC AUTO: 81.7 FL (ref 81–100)
MCV RBC AUTO: 82.8 FL (ref 81–100)
MONOCYTES # BLD AUTO: 0.48 X10(3) UL (ref 0.1–0.6)
MONOCYTES # BLD AUTO: 0.67 X10(3) UL (ref 0.1–0.6)
MONOCYTES NFR BLD AUTO: 2.4 %
MONOCYTES NFR BLD AUTO: 5.4 %
NEUTROPHIL ABS PRELIM: 17.82 X10 (3) UL (ref 1.3–6.7)
NEUTROPHIL ABS PRELIM: 8.78 X10 (3) UL (ref 1.3–6.7)
NEUTROPHILS # BLD AUTO: 17.82 X10(3) UL (ref 1.3–6.7)
NEUTROPHILS # BLD AUTO: 8.78 X10(3) UL (ref 1.3–6.7)
NEUTROPHILS NFR BLD AUTO: 71 %
NEUTROPHILS NFR BLD AUTO: 87.5 %
PLATELET # BLD AUTO: 209 10(3)UL (ref 150–450)
PLATELET # BLD AUTO: 231 10(3)UL (ref 150–450)
POTASSIUM SERPL-SCNC: 4.1 MMOL/L (ref 3.6–5.1)
RBC # BLD AUTO: 4.15 X10(6)UL (ref 3.8–5.1)
RBC # BLD AUTO: 4.29 X10(6)UL (ref 3.8–5.1)
RED CELL DISTRIBUTION WIDTH-SD: 42.9 FL (ref 35.1–46.3)
RED CELL DISTRIBUTION WIDTH-SD: 44.2 FL (ref 35.1–46.3)
SODIUM SERPL-SCNC: 134 MMOL/L (ref 136–144)
SPECIMEN VOL UR: 2000 ML
SPECIMEN VOL UR: 2000 ML
URIC ACID: 3.7 MG/DL (ref 2.4–8)
WBC # BLD AUTO: 12.4 X10(3) UL (ref 4–13)
WBC # BLD AUTO: 20.4 X10(3) UL (ref 4–13)

## 2017-08-24 PROCEDURE — 82570 ASSAY OF URINE CREATININE: CPT

## 2017-08-24 PROCEDURE — 85025 COMPLETE CBC W/AUTO DIFF WBC: CPT

## 2017-08-24 PROCEDURE — 84550 ASSAY OF BLOOD/URIC ACID: CPT

## 2017-08-24 PROCEDURE — 76705 ECHO EXAM OF ABDOMEN: CPT | Performed by: OBSTETRICS & GYNECOLOGY

## 2017-08-24 PROCEDURE — 80053 COMPREHEN METABOLIC PANEL: CPT

## 2017-08-24 PROCEDURE — 36415 COLL VENOUS BLD VENIPUNCTURE: CPT

## 2017-08-24 PROCEDURE — 80053 COMPREHEN METABOLIC PANEL: CPT | Performed by: OBSTETRICS & GYNECOLOGY

## 2017-08-24 PROCEDURE — 84550 ASSAY OF BLOOD/URIC ACID: CPT | Performed by: OBSTETRICS & GYNECOLOGY

## 2017-08-24 PROCEDURE — 84156 ASSAY OF PROTEIN URINE: CPT

## 2017-08-24 PROCEDURE — 85025 COMPLETE CBC W/AUTO DIFF WBC: CPT | Performed by: OBSTETRICS & GYNECOLOGY

## 2017-08-24 RX ORDER — SODIUM CHLORIDE, SODIUM LACTATE, POTASSIUM CHLORIDE, CALCIUM CHLORIDE 600; 310; 30; 20 MG/100ML; MG/100ML; MG/100ML; MG/100ML
INJECTION, SOLUTION INTRAVENOUS CONTINUOUS
Status: DISCONTINUED | OUTPATIENT
Start: 2017-08-24 | End: 2017-08-26

## 2017-08-24 RX ORDER — TRISODIUM CITRATE DIHYDRATE AND CITRIC ACID MONOHYDRATE 500; 334 MG/5ML; MG/5ML
30 SOLUTION ORAL ONCE
Status: DISCONTINUED | OUTPATIENT
Start: 2017-08-24 | End: 2017-08-26

## 2017-08-24 RX ORDER — ONDANSETRON 2 MG/ML
4 INJECTION INTRAMUSCULAR; INTRAVENOUS EVERY 6 HOURS PRN
Status: DISCONTINUED | OUTPATIENT
Start: 2017-08-24 | End: 2017-08-26

## 2017-08-24 RX ORDER — HYDROMORPHONE HYDROCHLORIDE 1 MG/ML
0.5 INJECTION, SOLUTION INTRAMUSCULAR; INTRAVENOUS; SUBCUTANEOUS ONCE
Status: DISCONTINUED | OUTPATIENT
Start: 2017-08-24 | End: 2017-08-27

## 2017-08-24 NOTE — PROGRESS NOTES
Results D/w Dr Judge Knows increased elevated of liver enzymes and 24' urine,  Check TORCH and Hepatitis

## 2017-08-25 LAB
ALBUMIN SERPL-MCNC: 2 G/DL (ref 3.5–4.8)
ALBUMIN SERPL-MCNC: 2.1 G/DL (ref 3.5–4.8)
ALP LIVER SERPL-CCNC: 368 U/L (ref 37–98)
ALP LIVER SERPL-CCNC: 422 U/L (ref 37–98)
ALT SERPL-CCNC: 172 U/L (ref 14–54)
ALT SERPL-CCNC: 180 U/L (ref 14–54)
ANTIBODY SCREEN: NEGATIVE
AST SERPL-CCNC: 120 U/L (ref 15–41)
AST SERPL-CCNC: 124 U/L (ref 15–41)
BASOPHILS # BLD AUTO: 0.03 X10(3) UL (ref 0–0.1)
BASOPHILS NFR BLD AUTO: 0.2 %
BILIRUB SERPL-MCNC: 0.6 MG/DL (ref 0.1–2)
BILIRUB SERPL-MCNC: 0.6 MG/DL (ref 0.1–2)
BUN BLD-MCNC: 14 MG/DL (ref 8–20)
BUN BLD-MCNC: 18 MG/DL (ref 8–20)
CALCIUM BLD-MCNC: 8.7 MG/DL (ref 8.3–10.3)
CALCIUM BLD-MCNC: 9 MG/DL (ref 8.3–10.3)
CHLORIDE: 103 MMOL/L (ref 101–111)
CHLORIDE: 103 MMOL/L (ref 101–111)
CO2: 20 MMOL/L (ref 22–32)
CO2: 21 MMOL/L (ref 22–32)
CONTROL RUN WITHIN 24 HOURS?: YES
CREAT BLD-MCNC: 0.84 MG/DL (ref 0.55–1.02)
CREAT BLD-MCNC: 0.85 MG/DL (ref 0.55–1.02)
EOSINOPHIL # BLD AUTO: 0 X10(3) UL (ref 0–0.3)
EOSINOPHIL NFR BLD AUTO: 0 %
ERYTHROCYTE [DISTWIDTH] IN BLOOD BY AUTOMATED COUNT: 15.1 % (ref 11.5–16)
GLUCOSE BLD-MCNC: 105 MG/DL (ref 70–99)
GLUCOSE BLD-MCNC: 114 MG/DL (ref 70–99)
GLUCOSE URINE: NEGATIVE
HAV IGM SER QL: NONREACTIVE
HBV CORE IGM SER QL: NONREACTIVE
HBV SURFACE AG SERPL QL IA: NONREACTIVE
HCT VFR BLD AUTO: 34.3 % (ref 34–50)
HEPATITIS C VIRUS AB INTERPRETATION: NONREACTIVE
HGB BLD-MCNC: 10.6 G/DL (ref 12–16)
IMMATURE GRANULOCYTE COUNT: 0.17 X10(3) UL (ref 0–1)
IMMATURE GRANULOCYTE RATIO %: 0.9 %
KETONE URINE: 15
LEUKOCYTE ESTERASE URINE: NEGATIVE
LYMPHOCYTES # BLD AUTO: 1.42 X10(3) UL (ref 0.9–4)
LYMPHOCYTES NFR BLD AUTO: 7.6 %
M PROTEIN MFR SERPL ELPH: 7.5 G/DL (ref 6.1–8.3)
M PROTEIN MFR SERPL ELPH: 8 G/DL (ref 6.1–8.3)
MCH RBC QN AUTO: 25.5 PG (ref 27–33.2)
MCHC RBC AUTO-ENTMCNC: 30.9 G/DL (ref 31–37)
MCV RBC AUTO: 82.7 FL (ref 81–100)
MONOCYTES # BLD AUTO: 0.2 X10(3) UL (ref 0.1–0.6)
MONOCYTES NFR BLD AUTO: 1.1 %
NEUTROPHIL ABS PRELIM: 16.81 X10 (3) UL (ref 1.3–6.7)
NEUTROPHILS # BLD AUTO: 16.81 X10(3) UL (ref 1.3–6.7)
NEUTROPHILS NFR BLD AUTO: 90.2 %
NITRITE URINE: NEGATIVE
PH URINE: 5.5 (ref 5–8)
PLATELET # BLD AUTO: 226 10(3)UL (ref 150–450)
POTASSIUM SERPL-SCNC: 3.7 MMOL/L (ref 3.6–5.1)
POTASSIUM SERPL-SCNC: 4.3 MMOL/L (ref 3.6–5.1)
PROTEIN URINE: 30
RBC # BLD AUTO: 4.15 X10(6)UL (ref 3.8–5.1)
RED CELL DISTRIBUTION WIDTH-SD: 44.9 FL (ref 35.1–46.3)
RH BLOOD TYPE: POSITIVE
SODIUM SERPL-SCNC: 135 MMOL/L (ref 136–144)
SODIUM SERPL-SCNC: 136 MMOL/L (ref 136–144)
SPEC GRAVITY: >=1.03 (ref 1–1.03)
T PALLIDUM AB SER QL IA: NONREACTIVE
URIC ACID: 4.1 MG/DL (ref 2.4–8)
URIC ACID: 5.2 MG/DL (ref 2.4–8)
UROBILINOGEN URINE: 0.2
WBC # BLD AUTO: 18.6 X10(3) UL (ref 4–13)

## 2017-08-25 PROCEDURE — 87045 FECES CULTURE AEROBIC BACT: CPT | Performed by: OBSTETRICS & GYNECOLOGY

## 2017-08-25 PROCEDURE — 86850 RBC ANTIBODY SCREEN: CPT | Performed by: OBSTETRICS & GYNECOLOGY

## 2017-08-25 PROCEDURE — 87427 SHIGA-LIKE TOXIN AG IA: CPT | Performed by: OBSTETRICS & GYNECOLOGY

## 2017-08-25 PROCEDURE — 86901 BLOOD TYPING SEROLOGIC RH(D): CPT | Performed by: OBSTETRICS & GYNECOLOGY

## 2017-08-25 PROCEDURE — 87046 STOOL CULTR AEROBIC BACT EA: CPT | Performed by: OBSTETRICS & GYNECOLOGY

## 2017-08-25 PROCEDURE — 84550 ASSAY OF BLOOD/URIC ACID: CPT | Performed by: OBSTETRICS & GYNECOLOGY

## 2017-08-25 PROCEDURE — 81002 URINALYSIS NONAUTO W/O SCOPE: CPT

## 2017-08-25 PROCEDURE — 80074 ACUTE HEPATITIS PANEL: CPT | Performed by: OBSTETRICS & GYNECOLOGY

## 2017-08-25 PROCEDURE — 88305 TISSUE EXAM BY PATHOLOGIST: CPT | Performed by: OBSTETRICS & GYNECOLOGY

## 2017-08-25 PROCEDURE — 80053 COMPREHEN METABOLIC PANEL: CPT | Performed by: OBSTETRICS & GYNECOLOGY

## 2017-08-25 PROCEDURE — 86900 BLOOD TYPING SEROLOGIC ABO: CPT | Performed by: OBSTETRICS & GYNECOLOGY

## 2017-08-25 PROCEDURE — 85025 COMPLETE CBC W/AUTO DIFF WBC: CPT | Performed by: OBSTETRICS & GYNECOLOGY

## 2017-08-25 PROCEDURE — 86780 TREPONEMA PALLIDUM: CPT | Performed by: OBSTETRICS & GYNECOLOGY

## 2017-08-25 RX ORDER — HYDROMORPHONE HYDROCHLORIDE 1 MG/ML
INJECTION, SOLUTION INTRAMUSCULAR; INTRAVENOUS; SUBCUTANEOUS
Status: COMPLETED
Start: 2017-08-25 | End: 2017-08-25

## 2017-08-25 RX ORDER — HYDROCODONE BITARTRATE AND ACETAMINOPHEN 5; 325 MG/1; MG/1
1 TABLET ORAL EVERY 4 HOURS PRN
Status: DISCONTINUED | OUTPATIENT
Start: 2017-08-25 | End: 2017-08-27

## 2017-08-25 RX ORDER — SIMETHICONE 80 MG
80 TABLET,CHEWABLE ORAL 3 TIMES DAILY PRN
Status: DISCONTINUED | OUTPATIENT
Start: 2017-08-25 | End: 2017-08-27

## 2017-08-25 RX ORDER — SODIUM CHLORIDE, SODIUM LACTATE, POTASSIUM CHLORIDE, CALCIUM CHLORIDE 600; 310; 30; 20 MG/100ML; MG/100ML; MG/100ML; MG/100ML
INJECTION, SOLUTION INTRAVENOUS CONTINUOUS
Status: DISCONTINUED | OUTPATIENT
Start: 2017-08-25 | End: 2017-08-26

## 2017-08-25 RX ORDER — NALBUPHINE HCL 10 MG/ML
2.5 AMPUL (ML) INJECTION
Status: DISCONTINUED | OUTPATIENT
Start: 2017-08-25 | End: 2017-08-27

## 2017-08-25 RX ORDER — HYDROCODONE BITARTRATE AND ACETAMINOPHEN 5; 325 MG/1; MG/1
2 TABLET ORAL EVERY 4 HOURS PRN
Status: DISCONTINUED | OUTPATIENT
Start: 2017-08-25 | End: 2017-08-27

## 2017-08-25 RX ORDER — TERBUTALINE SULFATE 1 MG/ML
0.25 INJECTION, SOLUTION SUBCUTANEOUS AS NEEDED
Status: DISCONTINUED | OUTPATIENT
Start: 2017-08-25 | End: 2017-08-26

## 2017-08-25 RX ORDER — BISACODYL 10 MG
10 SUPPOSITORY, RECTAL RECTAL ONCE AS NEEDED
Status: ACTIVE | OUTPATIENT
Start: 2017-08-25 | End: 2017-08-25

## 2017-08-25 RX ORDER — DOCUSATE SODIUM 100 MG/1
100 CAPSULE, LIQUID FILLED ORAL
Status: DISCONTINUED | OUTPATIENT
Start: 2017-08-25 | End: 2017-08-27

## 2017-08-25 RX ORDER — ZOLPIDEM TARTRATE 5 MG/1
5 TABLET ORAL NIGHTLY PRN
Status: DISCONTINUED | OUTPATIENT
Start: 2017-08-25 | End: 2017-08-27

## 2017-08-25 RX ORDER — TRISODIUM CITRATE DIHYDRATE AND CITRIC ACID MONOHYDRATE 500; 334 MG/5ML; MG/5ML
30 SOLUTION ORAL AS NEEDED
Status: DISCONTINUED | OUTPATIENT
Start: 2017-08-25 | End: 2017-08-26

## 2017-08-25 RX ORDER — LOPERAMIDE HYDROCHLORIDE 2 MG/1
2 CAPSULE ORAL 4 TIMES DAILY PRN
Status: DISCONTINUED | OUTPATIENT
Start: 2017-08-25 | End: 2017-08-26

## 2017-08-25 RX ORDER — ACETAMINOPHEN 325 MG/1
650 TABLET ORAL EVERY 4 HOURS PRN
Status: DISCONTINUED | OUTPATIENT
Start: 2017-08-25 | End: 2017-08-27

## 2017-08-25 RX ORDER — BETAMETHASONE SODIUM PHOSPHATE AND BETAMETHASONE ACETATE 3; 3 MG/ML; MG/ML
12 INJECTION, SUSPENSION INTRA-ARTICULAR; INTRALESIONAL; INTRAMUSCULAR; SOFT TISSUE EVERY 24 HOURS
Status: DISCONTINUED | OUTPATIENT
Start: 2017-08-25 | End: 2017-08-26

## 2017-08-25 RX ORDER — BETAMETHASONE SODIUM PHOSPHATE AND BETAMETHASONE ACETATE 3; 3 MG/ML; MG/ML
INJECTION, SUSPENSION INTRA-ARTICULAR; INTRALESIONAL; INTRAMUSCULAR; SOFT TISSUE
Status: COMPLETED
Start: 2017-08-25 | End: 2017-08-25

## 2017-08-25 RX ORDER — IBUPROFEN 600 MG/1
600 TABLET ORAL EVERY 6 HOURS
Status: DISCONTINUED | OUTPATIENT
Start: 2017-08-25 | End: 2017-08-27

## 2017-08-25 RX ORDER — EPHEDRINE SULFATE 50 MG/ML
5 INJECTION, SOLUTION INTRAVENOUS AS NEEDED
Status: DISCONTINUED | OUTPATIENT
Start: 2017-08-25 | End: 2017-08-26

## 2017-08-25 RX ORDER — IBUPROFEN 600 MG/1
600 TABLET ORAL ONCE AS NEEDED
Status: DISCONTINUED | OUTPATIENT
Start: 2017-08-25 | End: 2017-08-25

## 2017-08-25 NOTE — PROGRESS NOTES
Pt presents as  w/ edc of 10/15/17 c/o severe upper abdominal pain since . Pt admitted to triage room 2 via wc accompanied by family member. EFM tested and applied, FHTs tracing and audible in 140s.  Pt states pain is constant, sharp, worsens at [place]

## 2017-08-25 NOTE — PROGRESS NOTES
Pt transferred to room 114 via cart accompanied by RN and family member. Report given to LESLY Retana.

## 2017-08-25 NOTE — PLAN OF CARE
ANXIETY    • Will report anxiety at manageable levels Progressing        BIRTH - VAGINAL/ SECTION    • Fetal and maternal status remain reassuring during the birth process Progressing        GASTROINTESTINAL - ADULT    • Minimal or absence of nause

## 2017-08-25 NOTE — H&P
[facility]  History & Physical    Barbara Pappas Patient Status:  Inpatient    1980 MRN [id number]   Location [address] [facility] Attending Rabia Ash MD   Hosp Day # 1 PCP Wen Levy MD     SUBJECTIVE:    Christine Valverde nontender, gravid   Fetal Surveillance:  130s, reactive   q 2-4      Cervix: 1-2/80/-2 by RN     Lab Review:      Lab Results  Component Value Date   WBC 20.4 08/24/2017   HGB 10.7 08/24/2017   HCT 35.5 08/24/2017   .0 08/24/2017   CREATSERUM 0.85 0

## 2017-08-25 NOTE — PROGRESS NOTES
Labor Progress Note    Comfortable with epidural.  Temp:  [97 °F (36.1 °C)-98.3 °F (36.8 °C)] 97 °F (36.1 °C)  Pulse:  [] 97  Resp:  [22] 22  BP: ()/(52-96) 108/58  FHT:  baseline 130s, moderate variability, accels appreciated, no decels  [place]:

## 2017-08-25 NOTE — L&D DELIVERY NOTE
Edson Salgado  [[id number]]     Labor Events     labor?:  No    steroids?:  Partial Course   Antibiotics received during labor?:  Yes   Antibiotics (enter # doses in comment):  ampicillin   Rupture date:  17   Rupture time:  808   Rupture active withdrawal    Muscle tone Limp Some flexion Active motion    Respiratory effort Absent Weak cry; hypoventilation Good, crying                     1 Minute:   5 Minute:   10 Minute:   15 Minute:   20 Minute:     Skin color:   Heart rate:   Reflex irr without difficulty. The cord was subsequently clamped and cut following a 30+ second delay. No lacerations. The placenta delivered spontaneously and intact with a 3 vessel cord.  cc.

## 2017-08-25 NOTE — PLAN OF CARE
Problem: Patient/Family Goals  Goal: Patient/Family Long Term Goal  Patient's Long Term Goal: Safe, vaginal delivery    Interventions:  -   - See additional Care Plan goals for specific interventions    Outcome: Progressing    Goal: Patient/Family Short Te devices as appropriate  - Consider OT/PT consult to assist with strengthening/mobility  - Encourage toileting schedule   Outcome: Progressing      Problem: RESPIRATORY - ADULT  Goal: Achieves optimal ventilation and oxygenation  INTERVENTIONS:  - Assess fo

## 2017-08-25 NOTE — CONSULTS
[facility]  Maternal-Fetal Medicine Inpatient Consultation    Date of Admission:  8/22/2017  Date of Consult:  8/25/2017    Reason for Consult:   Elevated liver enzymes and right upper quadrant pain    History of Present Illness:  Bang ríos a edie( High Blood Pressure Father    • Diabetes Mother      borderline diabetic   • Hypertension Mother    • hyperlipidemia [OTHER] Mother       reports that she has never smoked. She has never used smokeless tobacco. She reports that she drinks alcohol.  She repo 08/25/2017   HGB 7.7 08/25/2017   HCT 24.5 08/25/2017   .0 08/25/2017       FETAL STATUS:  FHRT: 135 baseline, moderate and minimal variability (likely due to MgSO4), Reactive, decelerations: none  Uterine Contractions: regular, every 1-2 minutes diet-controlled unlikely that any significant interventions will be needed  · Continued monitoring of liver function tests throughout labor and postpartum  · If the patient is undelivered administer second dose of betamethasone    Thank you for allowing me

## 2017-08-25 NOTE — PROGRESS NOTES
Pt. Transferred to [facility] for continuity of care. Pt. Is in the bathroom with N/V/D. POC explained to pt. Will start Magnesium infusion once pt. Is in bed.

## 2017-08-26 LAB
ALBUMIN SERPL-MCNC: 1.7 G/DL (ref 3.5–4.8)
ALP LIVER SERPL-CCNC: 305 U/L (ref 37–98)
ALT SERPL-CCNC: 157 U/L (ref 14–54)
AST SERPL-CCNC: 101 U/L (ref 15–41)
BASOPHILS # BLD AUTO: 0.02 X10(3) UL (ref 0–0.1)
BASOPHILS NFR BLD AUTO: 0.1 %
BILIRUB SERPL-MCNC: 0.4 MG/DL (ref 0.1–2)
BUN BLD-MCNC: 13 MG/DL (ref 8–20)
CALCIUM BLD-MCNC: 6.6 MG/DL (ref 8.3–10.3)
CHLORIDE: 102 MMOL/L (ref 101–111)
CO2: 24 MMOL/L (ref 22–32)
CREAT BLD-MCNC: 0.78 MG/DL (ref 0.55–1.02)
EOSINOPHIL # BLD AUTO: 0.02 X10(3) UL (ref 0–0.3)
EOSINOPHIL NFR BLD AUTO: 0.1 %
ERYTHROCYTE [DISTWIDTH] IN BLOOD BY AUTOMATED COUNT: 15.2 % (ref 11.5–16)
GLUCOSE BLD-MCNC: 91 MG/DL (ref 70–99)
HCT VFR BLD AUTO: 31.1 % (ref 34–50)
HGB BLD-MCNC: 9.5 G/DL (ref 12–16)
IMMATURE GRANULOCYTE COUNT: 0.13 X10(3) UL (ref 0–1)
IMMATURE GRANULOCYTE RATIO %: 0.9 %
LYMPHOCYTES # BLD AUTO: 3.08 X10(3) UL (ref 0.9–4)
LYMPHOCYTES NFR BLD AUTO: 20.9 %
M PROTEIN MFR SERPL ELPH: 6.4 G/DL (ref 6.1–8.3)
MCH RBC QN AUTO: 25.2 PG (ref 27–33.2)
MCHC RBC AUTO-ENTMCNC: 30.5 G/DL (ref 31–37)
MCV RBC AUTO: 82.5 FL (ref 81–100)
MONOCYTES # BLD AUTO: 0.77 X10(3) UL (ref 0.1–0.6)
MONOCYTES NFR BLD AUTO: 5.2 %
NEUTROPHIL ABS PRELIM: 10.74 X10 (3) UL (ref 1.3–6.7)
NEUTROPHILS # BLD AUTO: 10.74 X10(3) UL (ref 1.3–6.7)
NEUTROPHILS NFR BLD AUTO: 72.8 %
PLATELET # BLD AUTO: 224 10(3)UL (ref 150–450)
POTASSIUM SERPL-SCNC: 4.1 MMOL/L (ref 3.6–5.1)
RBC # BLD AUTO: 3.77 X10(6)UL (ref 3.8–5.1)
RED CELL DISTRIBUTION WIDTH-SD: 45.1 FL (ref 35.1–46.3)
SODIUM SERPL-SCNC: 133 MMOL/L (ref 136–144)
WBC # BLD AUTO: 14.8 X10(3) UL (ref 4–13)

## 2017-08-26 PROCEDURE — 85025 COMPLETE CBC W/AUTO DIFF WBC: CPT | Performed by: OBSTETRICS & GYNECOLOGY

## 2017-08-26 PROCEDURE — 80053 COMPREHEN METABOLIC PANEL: CPT | Performed by: OBSTETRICS & GYNECOLOGY

## 2017-08-26 NOTE — PLAN OF CARE
Problem: SAFETY ADULT - FALL  Goal: Free from fall injury  INTERVENTIONS:  - Assess pt frequently for physical needs  - Identify cognitive and physical deficits and behaviors that affect risk of falls.   - [place] fall precautions as indicated by assessme

## 2017-08-26 NOTE — PLAN OF CARE
Problem: SAFETY ADULT - FALL  Goal: Free from fall injury  INTERVENTIONS:  - Assess pt frequently for physical needs  - Identify cognitive and physical deficits and behaviors that affect risk of falls.   - [place] fall precautions as indicated by assessme

## 2017-08-26 NOTE — PLAN OF CARE
Problem: SAFETY ADULT - FALL  Goal: Free from fall injury  INTERVENTIONS:  - Assess pt frequently for physical needs  - Identify cognitive and physical deficits and behaviors that affect risk of falls.   - [place] fall precautions as indicated by assessme

## 2017-08-26 NOTE — PROGRESS NOTES
Pt transferred  to Mother Baby room 2287 in stable condition. Report given to [facility]. Pt visited infant in NICU prior to transfer with RN assistance.

## 2017-08-26 NOTE — PLAN OF CARE
Problem: NEUROLOGICAL - ADULT  Goal: Absence of seizures  INTERVENTIONS  - Monitor for seizure activity  - Administer anti-seizure medications as ordered  - Monitor neurological status   Outcome: Progressing

## 2017-08-26 NOTE — PROGRESS NOTES
Barbara Pappas Patient Status:  Inpatient    1980 MRN [id number]   Location [facility] Attending Rabia Ash MD   Hosp Day # 2 PCP Wen Levy MD     Pt has no complaints  Breast Feeding:   On Magso4  /77

## 2017-08-26 NOTE — PLAN OF CARE
Problem: POSTPARTUM  Goal: Experiences normal breast weaning course  INTERVENTIONS:  - Assess for and manage engorgement. - Instruct on breast care. - Provide comfort measures.    Outcome: Progressing

## 2017-08-26 NOTE — PLAN OF CARE
Problem: NEUROLOGICAL - ADULT  Goal: Remains free of injury related to seizure activity  INTERVENTIONS:  - Maintain airway, patient safety  and administer oxygen as ordered  - Monitor patient for seizure activity, document and report duration and descripti

## 2017-08-27 VITALS
HEART RATE: 74 BPM | BODY MASS INDEX: 35.14 KG/M2 | OXYGEN SATURATION: 97 % | RESPIRATION RATE: 17 BRPM | TEMPERATURE: 98 F | WEIGHT: 179 LBS | SYSTOLIC BLOOD PRESSURE: 114 MMHG | HEIGHT: 60 IN | DIASTOLIC BLOOD PRESSURE: 70 MMHG

## 2017-08-27 LAB
ALBUMIN SERPL-MCNC: 1.7 G/DL (ref 3.5–4.8)
ALP LIVER SERPL-CCNC: 338 U/L (ref 37–98)
ALT SERPL-CCNC: 119 U/L (ref 14–54)
AST SERPL-CCNC: 57 U/L (ref 15–41)
BASOPHILS # BLD AUTO: 0.02 X10(3) UL (ref 0–0.1)
BASOPHILS NFR BLD AUTO: 0.2 %
BILIRUB SERPL-MCNC: 0.3 MG/DL (ref 0.1–2)
BUN BLD-MCNC: 15 MG/DL (ref 8–20)
CALCIUM BLD-MCNC: 8.3 MG/DL (ref 8.3–10.3)
CHLORIDE: 105 MMOL/L (ref 101–111)
CO2: 26 MMOL/L (ref 22–32)
CREAT BLD-MCNC: 0.71 MG/DL (ref 0.55–1.02)
EOSINOPHIL # BLD AUTO: 0.12 X10(3) UL (ref 0–0.3)
EOSINOPHIL NFR BLD AUTO: 0.9 %
ERYTHROCYTE [DISTWIDTH] IN BLOOD BY AUTOMATED COUNT: 15.2 % (ref 11.5–16)
GLUCOSE BLD-MCNC: 81 MG/DL (ref 70–99)
HCT VFR BLD AUTO: 29.8 % (ref 34–50)
HGB BLD-MCNC: 9.1 G/DL (ref 12–16)
IMMATURE GRANULOCYTE COUNT: 0.12 X10(3) UL (ref 0–1)
IMMATURE GRANULOCYTE RATIO %: 0.9 %
LYMPHOCYTES # BLD AUTO: 2.82 X10(3) UL (ref 0.9–4)
LYMPHOCYTES NFR BLD AUTO: 22 %
M PROTEIN MFR SERPL ELPH: 6.5 G/DL (ref 6.1–8.3)
MCH RBC QN AUTO: 25.6 PG (ref 27–33.2)
MCHC RBC AUTO-ENTMCNC: 30.5 G/DL (ref 31–37)
MCV RBC AUTO: 83.9 FL (ref 81–100)
MONOCYTES # BLD AUTO: 0.68 X10(3) UL (ref 0.1–0.6)
MONOCYTES NFR BLD AUTO: 5.3 %
NEUTROPHIL ABS PRELIM: 9.03 X10 (3) UL (ref 1.3–6.7)
NEUTROPHILS # BLD AUTO: 9.03 X10(3) UL (ref 1.3–6.7)
NEUTROPHILS NFR BLD AUTO: 70.7 %
PLATELET # BLD AUTO: 195 10(3)UL (ref 150–450)
POTASSIUM SERPL-SCNC: 4.4 MMOL/L (ref 3.6–5.1)
RBC # BLD AUTO: 3.55 X10(6)UL (ref 3.8–5.1)
RED CELL DISTRIBUTION WIDTH-SD: 46.2 FL (ref 35.1–46.3)
SODIUM SERPL-SCNC: 137 MMOL/L (ref 136–144)
WBC # BLD AUTO: 12.8 X10(3) UL (ref 4–13)

## 2017-08-27 PROCEDURE — 85025 COMPLETE CBC W/AUTO DIFF WBC: CPT | Performed by: OBSTETRICS & GYNECOLOGY

## 2017-08-27 PROCEDURE — 80053 COMPREHEN METABOLIC PANEL: CPT | Performed by: OBSTETRICS & GYNECOLOGY

## 2017-08-27 NOTE — PLAN OF CARE
Problem: POSTPARTUM  Goal: Long Term Goal:Experiences normal postpartum course  INTERVENTIONS:  - Assess and monitor vital signs and lab values. - Assess fundus and lochia. - Provide ice/sitz baths for perineum discomfort.   - Monitor healing of incision/ pain/trauma. - Instruct and provide assistance with proper latch. - Review techniques for milk expression (breast pumping) and storage of breast milk. Provide pumping equipment/supplies, instructions and assistance, as needed.   - Encourage rooming-in and breast feeding.  - Assess and monitor for signs of nipple pain/trauma. - Instruct and provide assistance with proper latch. - Review techniques for milk expression (breast pumping) and storage of breast milk.  Provide pumping equipment/supplies, instructi

## 2017-08-27 NOTE — PROGRESS NOTES
Juarezhilton Rayo Patient Status:  Inpatient    1980 MRN [id number]   [facility]SW[facility]J Attending Andres Penaloza MD   Hosp Day # 3 PCP Kush Gallardo MD     Pt has no complaints  Breast Feeding:  Pumping with NICU baby    Donovan Case

## 2017-08-27 NOTE — CM/SW NOTE
JOSE ANGEL received an order for EPDS 8. RN stated the pt would be discharged today. Informed her the she would need to consult the psych liaison from [facility]. JOSE ANGEL to follow up if any other needs arise.

## 2017-08-27 NOTE — PLAN OF CARE
GASTROINTESTINAL - ADULT    • Minimal or absence of nausea and vomiting Completed    • Maintains adequate nutritional intake (undernourished) Completed    • Achieves appropriate nutritional intake (bariatric) Completed        NEUROLOGICAL - ADULT    • Abse

## 2017-08-27 NOTE — BH PROGRESS NOTE
NATALIA PPD SCREENING    Reason for Referral: Pt scored an 8 on EPDS. Per RN baby in NICU, pt delivered at about 35 weeks. No SI/HI. // Pts  was present.  did not report any concerns.   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

## 2017-08-27 NOTE — PROGRESS NOTES
Discharge patient home as order. Teaching complete, patient feel comfortable to take care herself and baby is stay in NICU. All questions are addressed.

## 2017-08-29 ENCOUNTER — TELEPHONE (OUTPATIENT)
Dept: OBGYN UNIT | Facility: HOSPITAL | Age: 37
End: 2017-08-29

## 2017-08-31 ENCOUNTER — TELEPHONE (OUTPATIENT)
Dept: OBGYN UNIT | Facility: HOSPITAL | Age: 37
End: 2017-08-31

## 2017-08-31 ENCOUNTER — APPOINTMENT (OUTPATIENT)
Dept: LACTATION | Facility: HOSPITAL | Age: 37
End: 2017-08-31
Attending: OBSTETRICS & GYNECOLOGY
Payer: MEDICAID

## 2017-08-31 NOTE — PROGRESS NOTES
REV'D SELF CARE WITH MOM. VERBALIZES UNDERSTANDING OF INSTRUCTIONS REV'D. ENCOURAGED TO FOLLOW-UP WITH MD AS DIRECTED AND WITH QUESTIONS. BABY REMAINING IN NICU.

## 2017-09-08 PROBLEM — K83.1 CHOLESTASIS DURING PREGNANCY, ANTEPARTUM: Status: RESOLVED | Noted: 2017-08-20 | Resolved: 2017-08-25

## 2017-09-08 PROBLEM — O26.619 CHOLESTASIS DURING PREGNANCY, ANTEPARTUM: Status: RESOLVED | Noted: 2017-08-20 | Resolved: 2017-08-25

## 2017-09-30 ENCOUNTER — APPOINTMENT (OUTPATIENT)
Dept: LACTATION | Facility: HOSPITAL | Age: 37
End: 2017-09-30
Attending: OBSTETRICS & GYNECOLOGY
Payer: MEDICAID

## 2017-10-31 ENCOUNTER — APPOINTMENT (OUTPATIENT)
Dept: LACTATION | Facility: HOSPITAL | Age: 37
End: 2017-10-31
Attending: OBSTETRICS & GYNECOLOGY
Payer: MEDICAID

## 2017-11-30 ENCOUNTER — APPOINTMENT (OUTPATIENT)
Dept: LACTATION | Facility: HOSPITAL | Age: 37
End: 2017-11-30
Attending: OBSTETRICS & GYNECOLOGY
Payer: MEDICAID

## 2017-12-31 ENCOUNTER — APPOINTMENT (OUTPATIENT)
Dept: LACTATION | Facility: HOSPITAL | Age: 37
End: 2017-12-31
Attending: OBSTETRICS & GYNECOLOGY
Payer: MEDICAID

## 2018-01-31 ENCOUNTER — APPOINTMENT (OUTPATIENT)
Dept: LACTATION | Facility: HOSPITAL | Age: 38
End: 2018-01-31
Attending: OBSTETRICS & GYNECOLOGY
Payer: MEDICAID

## 2018-02-06 PROCEDURE — 88175 CYTOPATH C/V AUTO FLUID REDO: CPT | Performed by: OBSTETRICS & GYNECOLOGY

## 2018-02-06 PROCEDURE — 87624 HPV HI-RISK TYP POOLED RSLT: CPT | Performed by: OBSTETRICS & GYNECOLOGY

## 2018-02-28 ENCOUNTER — APPOINTMENT (OUTPATIENT)
Dept: LACTATION | Facility: HOSPITAL | Age: 38
End: 2018-02-28
Attending: OBSTETRICS & GYNECOLOGY

## 2018-03-31 ENCOUNTER — APPOINTMENT (OUTPATIENT)
Dept: LACTATION | Facility: HOSPITAL | Age: 38
End: 2018-03-31
Attending: OBSTETRICS & GYNECOLOGY

## 2018-04-30 ENCOUNTER — APPOINTMENT (OUTPATIENT)
Dept: LACTATION | Facility: HOSPITAL | Age: 38
End: 2018-04-30
Attending: OBSTETRICS & GYNECOLOGY

## 2018-05-31 ENCOUNTER — APPOINTMENT (OUTPATIENT)
Dept: LACTATION | Facility: HOSPITAL | Age: 38
End: 2018-05-31
Attending: OBSTETRICS & GYNECOLOGY

## 2018-06-07 ENCOUNTER — APPOINTMENT (OUTPATIENT)
Dept: LACTATION | Facility: HOSPITAL | Age: 38
End: 2018-06-07
Attending: OBSTETRICS & GYNECOLOGY